# Patient Record
Sex: FEMALE | Race: WHITE
[De-identification: names, ages, dates, MRNs, and addresses within clinical notes are randomized per-mention and may not be internally consistent; named-entity substitution may affect disease eponyms.]

---

## 2021-03-29 ENCOUNTER — HOSPITAL ENCOUNTER (OUTPATIENT)
Dept: HOSPITAL 7 - FB.SDS | Age: 71
Setting detail: OBSERVATION
Discharge: NURSING, REHAB OR LONG-TERM CARE | End: 2021-03-29
Attending: SURGERY | Admitting: SURGERY
Payer: MEDICARE

## 2021-03-29 DIAGNOSIS — Z88.2: ICD-10-CM

## 2021-03-29 DIAGNOSIS — A41.9: ICD-10-CM

## 2021-03-29 DIAGNOSIS — Z98.890: ICD-10-CM

## 2021-03-29 DIAGNOSIS — Z79.82: ICD-10-CM

## 2021-03-29 DIAGNOSIS — Z79.890: ICD-10-CM

## 2021-03-29 DIAGNOSIS — I10: ICD-10-CM

## 2021-03-29 DIAGNOSIS — Z79.899: ICD-10-CM

## 2021-03-29 DIAGNOSIS — E03.9: ICD-10-CM

## 2021-03-29 DIAGNOSIS — Z88.6: ICD-10-CM

## 2021-03-29 DIAGNOSIS — Z20.822: ICD-10-CM

## 2021-03-29 DIAGNOSIS — Z91.09: ICD-10-CM

## 2021-03-29 DIAGNOSIS — E78.5: ICD-10-CM

## 2021-03-29 DIAGNOSIS — L02.415: Primary | ICD-10-CM

## 2021-03-29 DIAGNOSIS — E66.9: ICD-10-CM

## 2021-03-29 DIAGNOSIS — A48.0: ICD-10-CM

## 2021-03-29 DIAGNOSIS — Z91.018: ICD-10-CM

## 2021-03-29 DIAGNOSIS — E11.9: ICD-10-CM

## 2021-03-29 DIAGNOSIS — Z79.84: ICD-10-CM

## 2021-03-29 PROCEDURE — U0002 COVID-19 LAB TEST NON-CDC: HCPCS

## 2021-03-29 NOTE — CT
CT LOWER EXTREMITY RIGHT  6203



INDICATION:  Right thigh lump--infection.  



Spiral 1.25 mm axial sections were obtained through the thigh areas bilaterally 
with sagittal and coronal reconstructions 03/29/2021--no comparisons.

Total exam DLP was 1527.45 mGy--cm.



There is a large area of increased density in the medial right thigh extending 
from the inferior pubic ramus along the anterior pubic area inferiorly and 
posteriorly into the upper buttock area and into the thigh medially to a level 
of approximately the mid shaft of the femur on the right.  High density fat 
stranding extends inferior to that also--to the knee in decreasing quantity of 
abnormality.  More superiorly there is extensive subcutaneous emphysema which 
may signal the presence of a gas forming organism infection.  



In the medial thigh in this area of abnormality, there is an area of irregular 
somewhat oval shaped abnormality with a large air-fluid level compatible with an
abscess.  This measures approximately 6 cm in anterior posterior diameter and 
measures approximately 37 mm transversely.  The abscess appears to be relatively
poorly visualized on the coronal view but appears to measure approximately 5 cm 
craniocaudad.  



No solid component of the mass area is identified.  



IMPRESSION:  Extensive subcutaneous emphysema, fat stranding and abscess 
formation, likely cutaneous and subcutaneous infection with abscess medial right
thigh with some changes extending to the knee joint in a less severe fashion.  



Report was given in person to Dr. Beatty immediately after the examination was 
available.  

[facility]

## 2021-03-29 NOTE — PCM.SURGPN
- General Info


Date of Service: 03/29/21





- Review of Systems


General: Denies: Fever


Musculoskeletal: Reports: Leg Pain


Skin: Reports: Other


Systems Review Comment:: 





CT scan of rle was obtained. Demonstrated a significant amount of free air in 

the upper thigh.


WBC elevated. 


vitals on arrival very suggestive of early sepsis. 


sepsis protocol initiated. 





- Patient Data


Vitals - Most Recent: 


                                Last Vital Signs











Temp  98.0 F   03/29/21 14:50


 


Pulse  118 H  03/29/21 14:50


 


Resp  22 H  03/29/21 14:50


 


BP  94/58 L  03/29/21 14:50


 


Pulse Ox  94 L  03/29/21 14:50











Weight - Most Recent: 124.936 kg


I&O - Last 24 Hours: 


                                 Intake & Output











 03/29/21 03/29/21 03/29/21





 06:59 14:59 22:59


 


Intake Total   250


 


Balance   250











Lab Results Last 24 Hrs: 


                         Laboratory Results - last 24 hr











  03/29/21 03/29/21 03/29/21 Range/Units





  14:45 15:18 15:18 


 


WBC   23.9 H   (3.0-10.3)  x10-3/uL


 


RBC   4.24   (3.60-5.20)  x10(6)uL


 


Hgb   12.8   (11.4-15.5)  g/dL


 


Hct   39.1   (34.2-48.2)  %


 


MCV   92.3   (76.7-100.5)  fL


 


MCH   30.2   (23.9-33.9)  pg


 


MCHC   32.7   (31.9-34.8)  g/dL


 


RDW   13.8   (12.3-16.5)  %


 


Plt Count   339   (151-488)  x10(3)uL


 


MPV   8.2   (7.1-12.4)  fL


 


Add Manual Diff   Yes   


 


Neutrophils % (Manual)   86 H   (46-82)  %


 


Band Neutrophils %   9 H   (0-6)  %


 


Lymphocytes % (Manual)   2 L   (13-37)  %


 


Monocytes % (Manual)   3 L   (4-12)  %


 


Sodium    137  (135-145)  mmol/L


 


Potassium    3.3 L  (3.5-5.3)  mmol/L


 


Chloride    97 L  (100-110)  mmol/L


 


Carbon Dioxide    21  (21-32)  mmol/L


 


BUN    41 H  (7-18)  mg/dL


 


Creatinine    2.6 H*  (0.55-1.02)  mg/dL


 


Est Cr Clr Drug Dosing    16.52  mL/min


 


Estimated GFR (MDRD)    18 L  (>60)  


 


BUN/Creatinine Ratio    15.8  (9-20)  


 


Glucose    316 H  ()  mg/dL


 


Calcium    9.9  (8.6-10.2)  mg/dL


 


SARS-CoV-2 RNA (FRANCISCO)  Negative    (NEGATIVE)  











Med Orders - Current: 


                               Current Medications





Lactated Ringer's (Ringers, Lactated)  1,000 mls @ 125 mls/hr IV ASDIRECTED ADELIA


Sodium Chloride (Sodium Chloride 0.9% 10 Ml Syringe)  10 ml FLUSH ASDIRECTED PRN


   PRN Reason: Keep Vein Open


   Last Admin: 03/29/21 15:10 Dose:  10 ml


   Documented by: 


Sodium Chloride (Sodium Chloride 0.9% 10 Ml Syringe)  10 ml FLUSH ASDIRECTED PRN


   PRN Reason: Keep Vein Open





Discontinued Medications





Levofloxacin/Dextrose 750 mg/ (Premix)  150 mls @ 100 mls/hr IV ONETIME ONE


   Stop: 03/29/21 15:38


   Last Admin: 03/29/21 15:16 Dose:  100 mls/hr


   Documented by: 


Metronidazole 500 mg/ Premix  100 mls @ 100 mls/hr IV ONETIME ONE


   Stop: 03/29/21 15:08


   Last Admin: 03/29/21 15:16 Dose:  100 mls/hr


   Documented by: 











- Exam


General: Alert, Cooperative


Lungs: Clear to Auscultation, Normal Respiratory Effort


Cardiovascular: Regular Rate, Regular Rhythm


GI/Abdominal Exam: Normal Bowel Sounds, Soft, Non-Tender


Extremities: Redness (right inner thigh )


Skin: Other (see above )


Psy/Mental Status: Alert, Anxious





Sepsis Event Note





- Evaluation


Sepsis Screening Result: Severe Sepsis Risk


Current Stage of Sepsis: Sepsis


Possible Source of Sepsis: Skin/Soft Tissue





- Focused Exam


Sepsis Event Note Statement: Focused Sepsis Exam Completed


Vital Signs: 


                                   Vital Signs











  Temp Pulse Resp BP Pulse Ox


 


 03/29/21 14:50  98.0 F  118 H  22 H  94/58 L  94 L














- Problem List & Annotations


(1) Gas gangrene


SNOMED Code(s): 71011137


   Code(s): A48.0 - GAS GANGRENE   Status: Acute   Current Visit: Yes   

Annotation/Comment:: RLE


   





(2) Sepsis


SNOMED Code(s): 66050782


   Code(s): A41.9 - SEPSIS, UNSPECIFIED ORGANISM   Status: Acute   Current 

Visit: Yes   


Qualifiers: 


   Sepsis type: sepsis due to unspecified organism   Sepsis acute organ 

dysfunction status: unspecified   Qualified Code(s): A41.9 - Sepsis, unspecified

organism   





(3) HTN (hypertension)


SNOMED Code(s): 54629882


   Code(s): I10 - ESSENTIAL (PRIMARY) HYPERTENSION   Status: Acute   Current 

Visit: Yes   


Qualifiers: 


   Hypertension type: essential hypertension   Qualified Code(s): I10 - 

Essential (primary) hypertension   





(4) Diabetes mellitus


SNOMED Code(s): 86049694


   Code(s): E11.9 - TYPE 2 DIABETES MELLITUS WITHOUT COMPLICATIONS   Status: 

Acute   Current Visit: Yes   


Qualifiers: 


   Diabetes mellitus type: type 2   Diabetes mellitus long term insulin use: 

without long term use   Diabetes mellitus complication status: with 

hyperglycemia   Qualified Code(s): E11.65 - Type 2 diabetes mellitus with 

hyperglycemia   





(5) Hypothyroidism


SNOMED Code(s): 97274847


   Code(s): E03.9 - HYPOTHYROIDISM, UNSPECIFIED   Status: Acute   Current Visit:

Yes   


Qualifiers: 


   Hypothyroidism type: acquired   Qualified Code(s): E03.9 - Hypothyroidism, 

unspecified   





- Problem List Review


Problem List Initiated/Reviewed/Updated: Yes





- My Orders


Last 24 Hours: 


                               Active Orders 24 hr











 Category Date Time Status


 


 EKG Documentation Completion [RC] ASDIRECTED Care  03/29/21 15:56 Active


 


 Patient to Empty Bladder [RC] ASDIRECTED Care  03/29/21 14:04 Active


 


 Verify Patient Consent Obtain [RC] ASDIRECTED Care  03/29/21 14:04 Active


 


 Nothing Per Oral Diet [DIET] Diet  03/29/21 Dinner Ordered


 


 Chest 1V Frontal [CR] Stat Exams  03/29/21 17:29 Ordered


 


 COMPREHENSIVE METABOLIC PN,CMP [CHEM] Routine Lab  03/29/21 17:28 Ordered


 


 COMPREHENSIVE METABOLIC PN,CMP [CHEM] Stat Lab  03/29/21 17:29 Ordered


 


 CULTURE BLOOD [BC] Urgent Lab  03/29/21 17:28 Ordered


 


 CULTURE BLOOD [BC] Urgent Lab  03/29/21 17:28 Ordered


 


 CULTURE BLOOD [BC] Urgent Lab  03/29/21 17:30 Ordered


 


 CULTURE BLOOD [BC] Urgent Lab  03/29/21 17:30 Ordered


 


 INR,PT,PROTHROMBIN TIME [COAG] Routine Lab  03/29/21 17:28 Ordered


 


 LACTATE SEPSIS W/ REFLEX [CHEM] Stat Lab  03/29/21 17:29 Ordered


 


 LACTIC ACID [CHEM] Routine Lab  03/29/21 17:28 Ordered


 


 UA W/MICROSCOPIC [URIN] Stat Lab  03/29/21 17:29 Ordered


 


 Lactated Ringers [Ringers, Lactated] 1,000 ml Med  03/29/21 14:15 Active





 IV ASDIRECTED   


 


 Sodium Chloride 0.9% [Saline Flush] Med  03/29/21 14:04 Active





 10 ml FLUSH ASDIRECTED PRN   


 


 Sodium Chloride 0.9% [Saline Flush] Med  03/29/21 17:29 Active





 10 ml FLUSH ASDIRECTED PRN   


 


 Blood Culture x2 Reflex Set [OM.PC] Urgent Oth  03/29/21 17:28 Ordered


 


 Blood Culture x2 Reflex Set [OM.PC] Urgent Oth  03/29/21 17:29 Ordered


 


 Peripheral IV Insertion Adult [OM.PC] Routine Oth  03/29/21 14:04 Ordered


 


 Saline Lock Insert [OM.PC] Stat Oth  03/29/21 17:29 Ordered


 


 Severe Sepsis Onset Time [OM.PC] Stat Oth  03/29/21 17:29 Ordered


 


 Resuscitation Status Routine Resus Stat  03/29/21 14:04 Ordered


 


 EKG 12 Lead [EK] Stat Ther  03/29/21 15:56 Ordered








                                Medication Orders





Lactated Ringer's (Ringers, Lactated)  1,000 mls @ 125 mls/hr IV ASDIRECTED ADELIA


Sodium Chloride (Sodium Chloride 0.9% 10 Ml Syringe)  10 ml FLUSH ASDIRECTED PRN


   PRN Reason: Keep Vein Open


   Last Admin: 03/29/21 15:10  Dose: 10 ml


   Documented by: WILBUR


Sodium Chloride (Sodium Chloride 0.9% 10 Ml Syringe)  10 ml FLUSH ASDIRECTED PRN


   PRN Reason: Keep Vein Open











- Assessment


Assessment           (Free Text/Narrative):: 





This patient is in early stages of sepsis secondary to a soft tissue infection 

of the rle. Condition is complicated by her DM, renal status, as well as her 

BMI. 








- Plan


Plan                        (Free Text/Narrative):: 





transfer to [place]. Dr Maximo gaytan MD. 


Has already received Levaquin and Flagyl. 


HR did respond to fluid bolus. 


Sepsis protocol initiated.

## 2021-03-30 NOTE — CR
INDICATION:  Sepsis.  



CHEST ONE VIEW:  An AP upright portable view of the chest was obtained 03/29/21 
- no comparisons.  



Poor inspiration emphasizes markings.  The upper lung field pulmonary 
vasculature appears prominent, the heart appears prominent raising question of 
CHF.  



No consolidating pneumonia or pleural effusion was identified.  



There is an appearance of a mild dextroconvex scoliosis of the upper thoracic 
spine.  



Shoulder arthroplasty is noted on the right.  



Exogenous obesity is noted.  



IMPRESSION:  

1.  ASHD likely with probable enlargement of the heart and tortuous calcified 
aorta, with CHF suggested.  

2.  Exogenous obesity.  

MTDD

## 2022-10-29 ENCOUNTER — HOSPITAL ENCOUNTER (EMERGENCY)
Dept: HOSPITAL 7 - FB.ED | Age: 72
Discharge: HOME | End: 2022-10-29
Payer: MEDICARE

## 2022-10-29 DIAGNOSIS — Z88.5: ICD-10-CM

## 2022-10-29 DIAGNOSIS — Z79.84: ICD-10-CM

## 2022-10-29 DIAGNOSIS — Z88.1: ICD-10-CM

## 2022-10-29 DIAGNOSIS — I10: ICD-10-CM

## 2022-10-29 DIAGNOSIS — E66.9: ICD-10-CM

## 2022-10-29 DIAGNOSIS — E11.9: ICD-10-CM

## 2022-10-29 DIAGNOSIS — Z79.82: ICD-10-CM

## 2022-10-29 DIAGNOSIS — Z88.2: ICD-10-CM

## 2022-10-29 DIAGNOSIS — Z79.899: ICD-10-CM

## 2022-10-29 DIAGNOSIS — N39.0: Primary | ICD-10-CM

## 2022-10-29 PROCEDURE — 87088 URINE BACTERIA CULTURE: CPT

## 2022-10-29 PROCEDURE — 87186 SC STD MICRODIL/AGAR DIL: CPT

## 2022-10-29 PROCEDURE — 85025 COMPLETE CBC W/AUTO DIFF WBC: CPT

## 2022-10-29 PROCEDURE — 81001 URINALYSIS AUTO W/SCOPE: CPT

## 2022-10-29 PROCEDURE — 99284 EMERGENCY DEPT VISIT MOD MDM: CPT

## 2022-10-29 PROCEDURE — 87086 URINE CULTURE/COLONY COUNT: CPT

## 2022-10-29 PROCEDURE — 80053 COMPREHEN METABOLIC PANEL: CPT

## 2022-10-29 PROCEDURE — 36415 COLL VENOUS BLD VENIPUNCTURE: CPT

## 2023-03-16 ENCOUNTER — HOSPITAL ENCOUNTER (EMERGENCY)
Dept: HOSPITAL 7 - FB.ED | Age: 73
Discharge: NURSING, REHAB OR LONG-TERM CARE | End: 2023-03-16
Payer: MEDICARE

## 2023-03-16 DIAGNOSIS — Z88.5: ICD-10-CM

## 2023-03-16 DIAGNOSIS — Z79.82: ICD-10-CM

## 2023-03-16 DIAGNOSIS — E11.22: Primary | ICD-10-CM

## 2023-03-16 DIAGNOSIS — I50.9: ICD-10-CM

## 2023-03-16 DIAGNOSIS — E66.9: ICD-10-CM

## 2023-03-16 DIAGNOSIS — F25.1: ICD-10-CM

## 2023-03-16 DIAGNOSIS — I13.0: ICD-10-CM

## 2023-03-16 DIAGNOSIS — Z79.84: ICD-10-CM

## 2023-03-16 DIAGNOSIS — Z88.1: ICD-10-CM

## 2023-03-16 DIAGNOSIS — Z88.2: ICD-10-CM

## 2023-03-16 DIAGNOSIS — N18.9: ICD-10-CM

## 2023-03-17 ENCOUNTER — HOSPITAL ENCOUNTER (INPATIENT)
Dept: HOSPITAL 7 - FB.ED | Age: 73
LOS: 3 days | Discharge: NURSING, REHAB OR LONG-TERM CARE | DRG: 291 | End: 2023-03-20
Attending: FAMILY MEDICINE | Admitting: FAMILY MEDICINE
Payer: MEDICARE

## 2023-03-17 DIAGNOSIS — M10.9: ICD-10-CM

## 2023-03-17 DIAGNOSIS — E78.00: ICD-10-CM

## 2023-03-17 DIAGNOSIS — Z88.2: ICD-10-CM

## 2023-03-17 DIAGNOSIS — E11.9: ICD-10-CM

## 2023-03-17 DIAGNOSIS — D64.9: ICD-10-CM

## 2023-03-17 DIAGNOSIS — N18.32: ICD-10-CM

## 2023-03-17 DIAGNOSIS — R09.02: ICD-10-CM

## 2023-03-17 DIAGNOSIS — F81.9: ICD-10-CM

## 2023-03-17 DIAGNOSIS — Z79.899: ICD-10-CM

## 2023-03-17 DIAGNOSIS — M19.90: ICD-10-CM

## 2023-03-17 DIAGNOSIS — I50.9: ICD-10-CM

## 2023-03-17 DIAGNOSIS — Z91.410: ICD-10-CM

## 2023-03-17 DIAGNOSIS — F41.9: ICD-10-CM

## 2023-03-17 DIAGNOSIS — Z79.890: ICD-10-CM

## 2023-03-17 DIAGNOSIS — N30.01: ICD-10-CM

## 2023-03-17 DIAGNOSIS — U07.1: ICD-10-CM

## 2023-03-17 DIAGNOSIS — F25.1: ICD-10-CM

## 2023-03-17 DIAGNOSIS — Z88.5: ICD-10-CM

## 2023-03-17 DIAGNOSIS — I13.0: Primary | ICD-10-CM

## 2023-03-17 DIAGNOSIS — I11.0: ICD-10-CM

## 2023-03-17 DIAGNOSIS — Z88.8: ICD-10-CM

## 2023-03-17 DIAGNOSIS — E03.9: ICD-10-CM

## 2023-03-17 DIAGNOSIS — Z98.890: ICD-10-CM

## 2023-03-17 DIAGNOSIS — H91.90: ICD-10-CM

## 2023-03-17 DIAGNOSIS — Z79.84: ICD-10-CM

## 2023-03-17 DIAGNOSIS — Z79.82: ICD-10-CM

## 2023-03-17 DIAGNOSIS — E66.01: ICD-10-CM

## 2023-03-17 PROCEDURE — 36415 COLL VENOUS BLD VENIPUNCTURE: CPT

## 2023-03-17 PROCEDURE — 99285 EMERGENCY DEPT VISIT HI MDM: CPT

## 2023-03-17 PROCEDURE — 84443 ASSAY THYROID STIM HORMONE: CPT

## 2023-03-17 PROCEDURE — 96374 THER/PROPH/DIAG INJ IV PUSH: CPT

## 2023-03-17 PROCEDURE — 93005 ELECTROCARDIOGRAM TRACING: CPT

## 2023-03-17 PROCEDURE — 85379 FIBRIN DEGRADATION QUANT: CPT

## 2023-03-17 PROCEDURE — 93010 ELECTROCARDIOGRAM REPORT: CPT

## 2023-03-17 PROCEDURE — 83605 ASSAY OF LACTIC ACID: CPT

## 2023-03-17 PROCEDURE — U0002 COVID-19 LAB TEST NON-CDC: HCPCS

## 2023-03-17 PROCEDURE — 85610 PROTHROMBIN TIME: CPT

## 2023-03-17 PROCEDURE — 85730 THROMBOPLASTIN TIME PARTIAL: CPT

## 2023-03-17 RX ADMIN — Medication SCH MG: at 21:03

## 2023-03-17 RX ADMIN — Medication PRN ML: at 14:33

## 2023-03-17 RX ADMIN — ALBUTEROL SULFATE SCH MG: 2.5 SOLUTION RESPIRATORY (INHALATION) at 15:56

## 2023-03-17 RX ADMIN — AMOXICILLIN AND CLAVULANATE POTASSIUM SCH TAB: 500; 125 TABLET, FILM COATED ORAL at 20:55

## 2023-03-17 RX ADMIN — Medication PRN ML: at 10:18

## 2023-03-17 RX ADMIN — METFORMIN HYDROCHLORIDE SCH MG: 1000 TABLET ORAL at 18:05

## 2023-03-17 RX ADMIN — ALBUTEROL SULFATE SCH MG: 2.5 SOLUTION RESPIRATORY (INHALATION) at 21:00

## 2023-03-18 RX ADMIN — Medication SCH MG: at 08:21

## 2023-03-18 RX ADMIN — ALBUTEROL SULFATE SCH MG: 2.5 SOLUTION RESPIRATORY (INHALATION) at 06:12

## 2023-03-18 RX ADMIN — METFORMIN HYDROCHLORIDE SCH MG: 1000 TABLET ORAL at 18:03

## 2023-03-18 RX ADMIN — AMOXICILLIN AND CLAVULANATE POTASSIUM SCH TAB: 500; 125 TABLET, FILM COATED ORAL at 20:08

## 2023-03-18 RX ADMIN — ALBUTEROL SULFATE SCH MG: 2.5 SOLUTION RESPIRATORY (INHALATION) at 20:13

## 2023-03-18 RX ADMIN — METFORMIN HYDROCHLORIDE SCH MG: 1000 TABLET ORAL at 08:17

## 2023-03-18 RX ADMIN — ALBUTEROL SULFATE SCH MG: 2.5 SOLUTION RESPIRATORY (INHALATION) at 16:15

## 2023-03-18 RX ADMIN — AMOXICILLIN AND CLAVULANATE POTASSIUM SCH TAB: 500; 125 TABLET, FILM COATED ORAL at 08:18

## 2023-03-18 RX ADMIN — Medication SCH MG: at 20:16

## 2023-03-18 RX ADMIN — Medication PRN ML: at 08:17

## 2023-03-18 RX ADMIN — ALBUTEROL SULFATE SCH MG: 2.5 SOLUTION RESPIRATORY (INHALATION) at 11:07

## 2023-03-19 RX ADMIN — ALBUTEROL SULFATE SCH MG: 2.5 SOLUTION RESPIRATORY (INHALATION) at 13:15

## 2023-03-19 RX ADMIN — ALBUTEROL SULFATE SCH MG: 2.5 SOLUTION RESPIRATORY (INHALATION) at 06:26

## 2023-03-19 RX ADMIN — Medication SCH MG: at 20:35

## 2023-03-19 RX ADMIN — METFORMIN HYDROCHLORIDE SCH MG: 1000 TABLET ORAL at 17:29

## 2023-03-19 RX ADMIN — ALBUTEROL SULFATE SCH MG: 2.5 SOLUTION RESPIRATORY (INHALATION) at 20:36

## 2023-03-19 RX ADMIN — POTASSIUM CHLORIDE SCH MEQ: 1500 TABLET, EXTENDED RELEASE ORAL at 15:12

## 2023-03-19 RX ADMIN — ALBUTEROL SULFATE SCH MG: 2.5 SOLUTION RESPIRATORY (INHALATION) at 16:25

## 2023-03-19 RX ADMIN — POTASSIUM CHLORIDE SCH MEQ: 1500 TABLET, EXTENDED RELEASE ORAL at 09:27

## 2023-03-19 RX ADMIN — AMOXICILLIN AND CLAVULANATE POTASSIUM SCH TAB: 500; 125 TABLET, FILM COATED ORAL at 20:34

## 2023-03-19 RX ADMIN — METFORMIN HYDROCHLORIDE SCH MG: 1000 TABLET ORAL at 09:26

## 2023-03-19 RX ADMIN — AMOXICILLIN AND CLAVULANATE POTASSIUM SCH TAB: 500; 125 TABLET, FILM COATED ORAL at 09:19

## 2023-03-19 RX ADMIN — Medication SCH MG: at 09:24

## 2023-03-19 RX ADMIN — POTASSIUM CHLORIDE SCH MEQ: 1500 TABLET, EXTENDED RELEASE ORAL at 20:35

## 2023-03-19 RX ADMIN — Medication PRN ML: at 13:55

## 2023-03-19 RX ADMIN — Medication PRN ML: at 09:33

## 2023-03-20 RX ADMIN — ALBUTEROL SULFATE SCH MG: 2.5 SOLUTION RESPIRATORY (INHALATION) at 11:40

## 2023-03-20 RX ADMIN — ALBUTEROL SULFATE SCH MG: 2.5 SOLUTION RESPIRATORY (INHALATION) at 06:24

## 2023-03-20 RX ADMIN — AMOXICILLIN AND CLAVULANATE POTASSIUM SCH TAB: 500; 125 TABLET, FILM COATED ORAL at 08:28

## 2023-03-20 RX ADMIN — METFORMIN HYDROCHLORIDE SCH MG: 1000 TABLET ORAL at 08:28

## 2023-03-20 RX ADMIN — Medication SCH MG: at 08:30
